# Patient Record
(demographics unavailable — no encounter records)

---

## 2024-12-03 NOTE — PLAN
[FreeTextEntry1] : Discussed increasing Valtrex to 500 bid Referral to primary care for general check up

## 2024-12-03 NOTE — HISTORY OF PRESENT ILLNESS
[Y] : Patient is sexually active [N] : Patient denies prior pregnancies [Previously active] : previously active [FreeTextEntry1] : 21 yr old P0 with herpes type I and having very frequent outbreaks. Has been taking valtrex 500 mg daily. Has not been increasing to BID when having outbreak. Discussed increasing to bid for next month and see if recurrences decrease. Also referral to primary care for general check up to make sure immune system intact. [LMPDate] : 11/24/24

## 2024-12-17 NOTE — COUNSELING
[Nutrition/ Exercise/ Weight Management] : nutrition, exercise, weight management [Vitamins/Supplements] : vitamins/supplements [Breast Self Exam] : breast self exam [Contraception/ Emergency Contraception/ Safe Sexual Practices] : contraception, emergency contraception, safe sexual practices [Medication Management] : medication management [FreeTextEntry2] : vulvovaginal hygiene / care and vaginitis prevention reviewed

## 2024-12-17 NOTE — HISTORY OF PRESENT ILLNESS
[Y] : Patient uses contraception [Oral Contraceptive] : uses oral contraception pills [Condoms] : uses condoms [Monogamous (Male Partner)] : is monogamous with a male partner [FreeTextEntry1] : 21-year-old female G0 is here for a routine gynecological exam, states she is doing well on current BCM, her periods are well controlled. She states she has been having frequent out breaks of HSV 1, initially in genital area, but more recently on her mouth/lips, she was seen on 12/3/24 and advised by Dr. Davis to increase Valtrex to BID instead of once per day. She reports noticing vulvar irritation that started a few weeks ago, she thought it could have been herpes, but did not see any lesions, she c/o abnormal vaginal discharge with itching and odor x the past 5 days that has not gone away. Patient goes to college in [place] where she rides on the [employer] team daily for school. She states she has not been sexually active this year. She denies any dysuria, pelvic pain, fever or chills.  [GonorrheaDate] : 2023 [ChlamydiaDate] : 2023 [TextBox_102] : Age at Menarche 12 yr [LMPDate] : 11/25/24 [MensesFreq] : 28 [MensesLength] : 5 [PGHxTotal] : 0

## 2024-12-17 NOTE — DISCUSSION/SUMMARY
[FreeTextEntry1] : Well appearing female is here for a routine gynecological exam. She is doing well on current OCP, desires refill. She has also been doing better on Valtrex 500mg BID dosing, we reviewed HSV and treatment options (twice daily vs. 1000mg once daily as needed vs. suppression therapy), also discussed lysine supplements and immune health. Patient c/o vulvovaginitis, rides horses daily at college. VE suggestive of yeast. Rx for Diflucan , nystatin/triamcinolone sent. vaginitis prevention reviewed.   HCM: - PAP done - Rx refill for Junel fe sent; instructions and precautions given. - Rx for valtrex sent, instructions /precautions given - Safe sex/condom use recommended   RTO in 6 months for Annual GYN exam and as needed.

## 2024-12-17 NOTE — REVIEW OF SYSTEMS
[Genital Rash/Irritation] : genital rash/irritation [Negative] : Heme/Lymph [Dysuria] : no dysuria [Abn Vaginal bleeding] : no abnormal vaginal bleeding [Pelvic pain] : no pelvic pain

## 2024-12-17 NOTE — PHYSICAL EXAM
[Appropriately responsive] : appropriately responsive [Alert] : alert [No Acute Distress] : no acute distress [Regular Rate Rhythm] : regular rate rhythm [Soft] : soft [Non-tender] : non-tender [Non-distended] : non-distended [No HSM] : No HSM [No Lesions] : no lesions [No Mass] : no mass [Oriented x3] : oriented x3 [FreeTextEntry6] :  symmetric bilaterally, no palpable masses, no skin changes or dimpling, no nipple discharge, no adenopathy [FreeTextEntry1] : Labia/Clitoris: bilateral labial erythema noted, lower labia skin thickening/lichenification with superficial fissures, no ulcerations, no suspicious lesions. Vagina: erythema, +white creamy/ curd like discharge, no lesions visible or palpable.  Cervix: Smooth, pink, no lesions. No cervical motion tenderness. PAP collected Uterus: normal size, mid-position, mobile, nontender. Adnexa: No palpable adnexal masses, nontender bilaterally. [FreeTextEntry5] : PAP collected